# Patient Record
Sex: FEMALE | Race: WHITE | NOT HISPANIC OR LATINO | Employment: OTHER | URBAN - METROPOLITAN AREA
[De-identification: names, ages, dates, MRNs, and addresses within clinical notes are randomized per-mention and may not be internally consistent; named-entity substitution may affect disease eponyms.]

---

## 2017-04-24 ENCOUNTER — TRANSCRIBE ORDERS (OUTPATIENT)
Dept: ADMINISTRATIVE | Facility: HOSPITAL | Age: 72
End: 2017-04-24

## 2017-04-24 DIAGNOSIS — Z12.31 VISIT FOR SCREENING MAMMOGRAM: Primary | ICD-10-CM

## 2017-05-01 ENCOUNTER — HOSPITAL ENCOUNTER (OUTPATIENT)
Dept: MAMMOGRAPHY | Facility: HOSPITAL | Age: 72
Discharge: HOME | End: 2017-05-01
Attending: FAMILY MEDICINE | Admitting: FAMILY MEDICINE

## 2017-05-01 DIAGNOSIS — Z12.31 VISIT FOR SCREENING MAMMOGRAM: ICD-10-CM

## 2017-05-01 PROCEDURE — G0202 SCR MAMMO BI INCL CAD: HCPCS | Performed by: RADIOLOGY

## 2017-05-01 PROCEDURE — G0202 SCR MAMMO BI INCL CAD: HCPCS

## 2017-05-01 PROCEDURE — 77063 BREAST TOMOSYNTHESIS BI: CPT

## 2017-05-01 PROCEDURE — 77063 BREAST TOMOSYNTHESIS BI: CPT | Performed by: RADIOLOGY

## 2017-05-09 ENCOUNTER — TRANSCRIBE ORDERS (OUTPATIENT)
Dept: MAMMOGRAPHY | Facility: HOSPITAL | Age: 72
End: 2017-05-09

## 2017-05-09 ENCOUNTER — HOSPITAL ENCOUNTER (OUTPATIENT)
Dept: MAMMOGRAPHY | Facility: HOSPITAL | Age: 72
Discharge: HOME | End: 2017-05-09
Admitting: FAMILY MEDICINE

## 2017-05-09 DIAGNOSIS — R92.8 ABNORMAL MAMMOGRAM: ICD-10-CM

## 2017-05-09 DIAGNOSIS — R92.8 ABNORMAL MAMMOGRAM: Primary | ICD-10-CM

## 2017-05-09 PROCEDURE — G0279 TOMOSYNTHESIS, MAMMO: HCPCS

## 2017-05-09 PROCEDURE — G0204 DX MAMMO INCL CAD BI: HCPCS

## 2017-05-09 PROCEDURE — G0204 DX MAMMO INCL CAD BI: HCPCS | Performed by: RADIOLOGY

## 2017-05-09 PROCEDURE — G0279 TOMOSYNTHESIS, MAMMO: HCPCS | Performed by: RADIOLOGY

## 2017-05-26 ENCOUNTER — HOSPITAL ENCOUNTER (OUTPATIENT)
Dept: MAMMOGRAPHY | Facility: HOSPITAL | Age: 72
Discharge: HOME | End: 2017-05-26

## 2017-05-26 ENCOUNTER — HOSPITAL ENCOUNTER (OUTPATIENT)
Dept: MAMMOGRAPHY | Facility: HOSPITAL | Age: 72
Discharge: HOME | End: 2017-05-26
Attending: RADIOLOGY | Admitting: RADIOLOGY

## 2017-05-26 DIAGNOSIS — R92.8 ABNORMAL MAMMOGRAM: ICD-10-CM

## 2017-05-26 PROCEDURE — 19081 BX BREAST 1ST LESION STRTCTC: CPT | Performed by: RADIOLOGY

## 2017-05-26 PROCEDURE — G0206 DX MAMMO INCL CAD UNI: HCPCS | Performed by: RADIOLOGY

## 2017-05-26 PROCEDURE — 88305 TISSUE EXAM BY PATHOLOGIST: CPT | Performed by: RADIOLOGY

## 2017-05-26 PROCEDURE — 76098 X-RAY EXAM SURGICAL SPECIMEN: CPT

## 2017-05-26 RX ORDER — LIDOCAINE HYDROCHLORIDE 10 MG/ML
5 INJECTION, SOLUTION INFILTRATION; PERINEURAL ONCE
Status: COMPLETED | OUTPATIENT
Start: 2017-05-26 | End: 2017-05-26

## 2017-05-26 RX ORDER — LIDOCAINE HYDROCHLORIDE AND EPINEPHRINE 10; 10 MG/ML; UG/ML
10 INJECTION, SOLUTION INFILTRATION; PERINEURAL ONCE
Status: COMPLETED | OUTPATIENT
Start: 2017-05-26 | End: 2017-05-26

## 2017-05-26 RX ADMIN — LIDOCAINE HYDROCHLORIDE,EPINEPHRINE BITARTRATE 10 ML: 10; .01 INJECTION, SOLUTION INFILTRATION; PERINEURAL at 12:10

## 2017-05-26 RX ADMIN — LIDOCAINE HYDROCHLORIDE 5 ML: 10 INJECTION, SOLUTION INFILTRATION; PERINEURAL at 12:10

## 2017-05-30 LAB
CYTO UR: NORMAL
LAB AP CASE REPORT: NORMAL
LAB AP CLINICAL INFORMATION: NORMAL
LAB AP DIAGNOSIS COMMENT: NORMAL
Lab: NORMAL
PATH REPORT.FINAL DX SPEC: NORMAL
PATH REPORT.GROSS SPEC: NORMAL

## 2017-05-31 ENCOUNTER — TELEPHONE (OUTPATIENT)
Dept: MAMMOGRAPHY | Facility: HOSPITAL | Age: 72
End: 2017-05-31

## 2017-05-31 ENCOUNTER — TRANSCRIBE ORDERS (OUTPATIENT)
Dept: ADMINISTRATIVE | Facility: HOSPITAL | Age: 72
End: 2017-05-31

## 2017-05-31 DIAGNOSIS — Z12.31 VISIT FOR SCREENING MAMMOGRAM: ICD-10-CM

## 2017-05-31 DIAGNOSIS — R92.8 ABNORMAL MAMMOGRAM: Primary | ICD-10-CM

## 2017-08-15 ENCOUNTER — HOSPITAL ENCOUNTER (OUTPATIENT)
Dept: GENERAL RADIOLOGY | Facility: HOSPITAL | Age: 72
Discharge: HOME | End: 2017-08-15
Attending: FAMILY MEDICINE | Admitting: FAMILY MEDICINE

## 2017-08-15 ENCOUNTER — TRANSCRIBE ORDERS (OUTPATIENT)
Dept: ADMINISTRATIVE | Facility: HOSPITAL | Age: 72
End: 2017-08-15

## 2017-08-15 DIAGNOSIS — M17.11 OSTEOARTHRITIS OF RIGHT KNEE, UNSPECIFIED OSTEOARTHRITIS TYPE: ICD-10-CM

## 2017-08-15 DIAGNOSIS — M16.11 OSTEOARTHRITIS OF RIGHT HIP, UNSPECIFIED OSTEOARTHRITIS TYPE: ICD-10-CM

## 2017-08-15 DIAGNOSIS — M16.11 OSTEOARTHRITIS OF RIGHT HIP, UNSPECIFIED OSTEOARTHRITIS TYPE: Primary | ICD-10-CM

## 2017-08-15 PROCEDURE — 73502 X-RAY EXAM HIP UNI 2-3 VIEWS: CPT

## 2017-08-15 PROCEDURE — 73562 X-RAY EXAM OF KNEE 3: CPT

## 2017-11-28 ENCOUNTER — APPOINTMENT (OUTPATIENT)
Dept: MAMMOGRAPHY | Facility: HOSPITAL | Age: 72
End: 2017-11-28
Attending: FAMILY MEDICINE

## 2018-02-20 NOTE — TELEPHONE ENCOUNTER
Per patient  Request  E-rx  Advair 500mg /50 1 puff bid disp # 1  No refills she is  Making a appointment

## 2018-03-02 ENCOUNTER — OFFICE VISIT (OUTPATIENT)
Dept: PULMONOLOGY | Facility: CLINIC | Age: 73
End: 2018-03-02

## 2018-03-02 VITALS
RESPIRATION RATE: 18 BRPM | HEIGHT: 61 IN | WEIGHT: 120 LBS | HEART RATE: 72 BPM | DIASTOLIC BLOOD PRESSURE: 82 MMHG | TEMPERATURE: 97.2 F | SYSTOLIC BLOOD PRESSURE: 140 MMHG | OXYGEN SATURATION: 95 % | BODY MASS INDEX: 22.66 KG/M2

## 2018-03-02 DIAGNOSIS — D86.9 SARCOIDOSIS: Primary | ICD-10-CM

## 2018-03-02 PROCEDURE — 94726 PLETHYSMOGRAPHY LUNG VOLUMES: CPT | Performed by: NURSE PRACTITIONER

## 2018-03-02 PROCEDURE — 94729 DIFFUSING CAPACITY: CPT | Performed by: NURSE PRACTITIONER

## 2018-03-02 PROCEDURE — 94375 RESPIRATORY FLOW VOLUME LOOP: CPT | Performed by: NURSE PRACTITIONER

## 2018-03-02 PROCEDURE — 99214 OFFICE O/P EST MOD 30 MIN: CPT | Performed by: NURSE PRACTITIONER

## 2018-03-02 NOTE — PROGRESS NOTES
[facility]    CHIEF COMPLAINT    Follow-up asthma, limited smoking history, GERD    HISTORY OF PRESENT ILLNESS    Lucy Thompson is a 72 y.o.female here today for yearly follow-up, she is a history of moderate to severe obstruction due to a combination of asthma and limited smoking.  She has a history of sarcoidosis, and was last in the office 11/2016.    She has a very limited smoking history, minimal smoking just socially 1 or 2 cigarettes a week before she quit almost 18 years ago.     She's done well this past year with no hospitalizations or emergency room visits.  She does not feel limited by dyspnea.  She's had no recent respiratory infections.  She still has the Advair that she tries to take this once daily, she notices if she misses the second dose and has increasing dyspnea .     She denies abdominal pain or dysphagia.  She has no chest pain or palpitations.     She really doesn't have a cough or sputum production, she has not had any bronchitis or sinusitis over the past year and feels she is done well. She has no vision changes or skin rashes; and has a remote h/o sarcoidosis.     She denies wheezing and really doesn't have to use her pro-air that much.  She is current on her immunizations.            Patient Active Problem List   Diagnosis   • Allergic rhinitis   • Asthma   • Dyslipidemia   • Glaucoma   • Osteoarthritis   • Sarcoidosis       No Known Allergies    Current Outpatient Prescriptions:   •  albuterol (PROAIR HFA) 108 (90 BASE) MCG/ACT inhaler, ProAir HFA AERS; Patient Sig: ProAir HFA AERS INHALE 1-2 PUFFS EVERY 4-6 HOURS AS NEEDED AND AS DIRECTED.; 0; 03-May-2013; Active, Disp: , Rfl:   •  atorvastatin (LIPITOR) 10 MG tablet, Take  by mouth Daily., Disp: , Rfl:   •  betaxolol (BETOPTIC-S) 0.25 % ophthalmic suspension, Apply  to eye Every 12 (Twelve) Hours., Disp: , Rfl:   •  bimatoprost (LUMIGAN) 0.01 % ophthalmic drops, Apply  to eye Daily., Disp: , Rfl:   •  dorzolamide  "(TRUSOPT) 2 % ophthalmic solution, Apply  to eye 2 (Two) Times a Day., Disp: , Rfl:   •  fluticasone-salmeterol (ADVAIR DISKUS) 500-50 MCG/DOSE DISKUS, Inhale 1 puff 2 (Two) Times a Day., Disp: 60 each, Rfl: 0  •  omeprazole (priLOSEC) 20 MG capsule, Take 20 mg by mouth As Needed., Disp: , Rfl:   MEDICATION LIST AND ALLERGIES REVIEWED.    Social History   Substance Use Topics   • Smoking status: Former Smoker     Types: Cigarettes   • Smokeless tobacco: None      Comment: 1-2 cigaretts socially per week- quit 16 years ago   • Alcohol use No       FAMILY AND SOCIAL HISTORY REVIEWED.    Review of Systems   Constitutional: Negative for activity change, chills, fatigue and fever.   HENT: Positive for postnasal drip. Negative for hearing loss, nosebleeds and sneezing.    Respiratory: Negative for apnea, cough, chest tightness, shortness of breath, wheezing and stridor.    Cardiovascular: Negative for chest pain, palpitations and leg swelling.        NO INCREASE IN MILD BLE EDEMA; NO CALF TENDERNESS    Gastrointestinal: Negative for abdominal pain, diarrhea and nausea.   Neurological: Negative for dizziness, syncope and light-headedness.   .    /82 (BP Location: Left arm, Patient Position: Sitting, Cuff Size: Adult)  Pulse 72  Temp 97.2 °F (36.2 °C)  Resp 18  Ht 154.9 cm (60.98\")  Wt 54.4 kg (120 lb)  SpO2 95%  BMI 22.69 kg/m2  Physical Exam   Constitutional: She is oriented to person, place, and time. She appears well-developed. No distress.   HENT:   Head: Normocephalic.   Eyes: Pupils are equal, round, and reactive to light.   Neck: No JVD present. No thyromegaly present.   Cardiovascular: Normal rate, regular rhythm and normal heart sounds.  Exam reveals no friction rub.    Mild BLE edema; no venous cords or calf tenderness.    Pulmonary/Chest: Effort normal. No stridor. No respiratory distress. She has no wheezes. She has no rales. She exhibits no tenderness.   Lymphadenopathy:     She has no cervical " adenopathy.   Neurological: She is alert and oriented to person, place, and time.   Skin: She is not diaphoretic.   Psychiatric: She has a normal mood and affect. Her behavior is normal. Thought content normal.       RESULTS    Chest x-ray PA and lateral obtained in the office today: No acute infiltrates or effusions, no change from her film in 2015, she has mild chronic interstitial scarring in the mid lung zones bilaterally, no effusions or acute infiltrates.    PFTs: see scanned report      PROBLEM LIST    Allergic rhinitis    Asthma    Remote minimal smoking history    Sarcoidosis      DISCUSSION    She is trying not to use Advair but I told her to continue to use it regularly and that the prescription dose is always one puff morning one puff night, however I gave her written prescription and samples of Brio.  Possible she will like this better and it one puff a day she be getting the prescription strength.    She was given samples of Brio 200 with written instructions not to use it the same time his Advair because of the same medications, use 1 puff daily rinse and spit after use.    We reviewed x-ray and pulmonary function testing, she's done well since her last visit in like to follow-up in one year,and was told to call and given extensions 104 if needed for an earlier visit if problems arise prior to the scheduled followup.        Claritza Mares, RHETT  03/02/20181:23 PM  Electronically signed     Please note that portions of this note were completed with a voice recognition program. Efforts were made to edit the dictations, but occasionally words are mistranscribed.      CC: Andreas Segura MD

## 2018-04-04 ENCOUNTER — TELEPHONE (OUTPATIENT)
Dept: PULMONOLOGY | Facility: CLINIC | Age: 73
End: 2018-04-04

## 2018-04-04 NOTE — TELEPHONE ENCOUNTER
Claritza gave her  Breo at last visit to replace the Advair   She  Feels that she did better on the Advair would like to go back on it if that's ok?  will need an Rx sent

## 2018-04-09 ENCOUNTER — TELEPHONE (OUTPATIENT)
Dept: PULMONOLOGY | Facility: CLINIC | Age: 73
End: 2018-04-09

## 2018-04-09 RX ORDER — AZITHROMYCIN 250 MG/1
TABLET, FILM COATED ORAL
Qty: 6 TABLET | Refills: 0 | Status: SHIPPED | OUTPATIENT
Start: 2018-04-09 | End: 2018-10-30 | Stop reason: SDUPTHER

## 2018-04-09 RX ORDER — PREDNISONE 10 MG/1
TABLET ORAL
Qty: 21 TABLET | Refills: 0 | Status: SHIPPED | OUTPATIENT
Start: 2018-04-09 | End: 2019-03-19

## 2018-04-09 RX ORDER — ALBUTEROL SULFATE 90 UG/1
2 AEROSOL, METERED RESPIRATORY (INHALATION) EVERY 4 HOURS PRN
Qty: 1 INHALER | Refills: 6 | Status: SHIPPED | OUTPATIENT
Start: 2018-04-09 | End: 2019-03-19 | Stop reason: SDUPTHER

## 2018-04-09 NOTE — TELEPHONE ENCOUNTER
Ms Thompson complains of cough, congestion, and chest tightness. She denies fever or chest pain.   She denies any recent antibiotic use. She also requests a refill on her rescue inhaler.    Azithromycin, prednisone, and ProAir prescribed.

## 2018-10-30 ENCOUNTER — TELEPHONE (OUTPATIENT)
Dept: PULMONOLOGY | Facility: CLINIC | Age: 73
End: 2018-10-30

## 2018-10-30 RX ORDER — AZITHROMYCIN 250 MG/1
TABLET, FILM COATED ORAL
Qty: 6 TABLET | Refills: 0 | Status: SHIPPED | OUTPATIENT
Start: 2018-10-30 | End: 2019-03-19

## 2018-10-30 RX ORDER — PREDNISONE 10 MG/1
TABLET ORAL
Qty: 31 TABLET | Refills: 0 | Status: SHIPPED | OUTPATIENT
Start: 2018-10-30 | End: 2019-03-19

## 2018-10-30 NOTE — TELEPHONE ENCOUNTER
Pt notified that Rx ZPack, Prednisone, and Advair has been sent to pharmacy. Pt verbalized understanding.

## 2018-10-30 NOTE — TELEPHONE ENCOUNTER
Pt called stating that she is having a dry cough for 3 days and SOB. Denies fever or chest pain. Please advise. Pt is also requesting refill on Rx Advair to [facility].

## 2019-03-15 ENCOUNTER — TELEPHONE (OUTPATIENT)
Dept: PULMONOLOGY | Facility: CLINIC | Age: 74
End: 2019-03-15

## 2019-03-19 ENCOUNTER — OFFICE VISIT (OUTPATIENT)
Dept: PULMONOLOGY | Facility: CLINIC | Age: 74
End: 2019-03-19

## 2019-03-19 VITALS
HEART RATE: 68 BPM | RESPIRATION RATE: 18 BRPM | DIASTOLIC BLOOD PRESSURE: 84 MMHG | BODY MASS INDEX: 24.01 KG/M2 | SYSTOLIC BLOOD PRESSURE: 126 MMHG | OXYGEN SATURATION: 96 % | TEMPERATURE: 97.7 F | WEIGHT: 127 LBS

## 2019-03-19 DIAGNOSIS — D86.9 SARCOIDOSIS: ICD-10-CM

## 2019-03-19 DIAGNOSIS — J45.909 ASTHMA, UNSPECIFIED ASTHMA SEVERITY, UNSPECIFIED WHETHER COMPLICATED, UNSPECIFIED WHETHER PERSISTENT: Primary | ICD-10-CM

## 2019-03-19 DIAGNOSIS — J30.1 SEASONAL ALLERGIC RHINITIS DUE TO POLLEN: ICD-10-CM

## 2019-03-19 PROCEDURE — 94375 RESPIRATORY FLOW VOLUME LOOP: CPT | Performed by: NURSE PRACTITIONER

## 2019-03-19 PROCEDURE — 99213 OFFICE O/P EST LOW 20 MIN: CPT | Performed by: NURSE PRACTITIONER

## 2019-03-19 RX ORDER — ALBUTEROL SULFATE 90 UG/1
2 AEROSOL, METERED RESPIRATORY (INHALATION) EVERY 4 HOURS PRN
Qty: 1 INHALER | Refills: 6 | Status: SHIPPED | OUTPATIENT
Start: 2019-03-19 | End: 2020-06-02 | Stop reason: SDUPTHER

## 2019-03-19 NOTE — PROGRESS NOTES
[facility]monary Follow up    CHIEF COMPLAINT    Restrictive and obstructive airway disease    Subjective   HISTORY OF PRESENT ILLNESS    Lucy Thompson is a 73 y.o.female here today for annual follow-up.  She is less in the office in March 2018 by Ms. Mares.  She has been followed for history of moderate to severe obstructive airway disease and asthma.  With minimal smoking history, she only smoked socially 1-2 cigarettes a week and quit 18 years ago.  She does have a restrictive airway disease as well with occasional cough and wheezing.    She uses Advair twice daily, she has tried Breo in the past which caused her actually quite a bit more shortness of air and wheezing.  She is back on Advair 1-2 times a day as needed for her wheezing and shortness of breath.  She was a quite a bit more congested this fall and winter.  She is starting to feel better now.  Usually in the fall is her worst time when she starts taking Singulair as well as Zyrtec.    She denies any cough or sputum production today.  No postnasal drainage or hoarseness.    She does have a remote history of sarcoidosis diagnosed by mediastinoscopy almost 20 years ago.  She never required treatment.      Patient Active Problem List   Diagnosis   • Allergic rhinitis   • Asthma   • Dyslipidemia   • Glaucoma   • Osteoarthritis   • Sarcoidosis       No Known Allergies    Current Outpatient Medications:   •  albuterol sulfate HFA (PROAIR HFA) 108 (90 Base) MCG/ACT inhaler, Inhale 2 puffs Every 4 (Four) Hours As Needed for Wheezing or Shortness of Air., Disp: 1 inhaler, Rfl: 6  •  betaxolol (BETOPTIC-S) 0.25 % ophthalmic suspension, Apply  to eye Every 12 (Twelve) Hours., Disp: , Rfl:   •  bimatoprost (LUMIGAN) 0.01 % ophthalmic drops, Apply  to eye Daily., Disp: , Rfl:   •  dorzolamide (TRUSOPT) 2 % ophthalmic solution, Apply  to eye 2 (Two) Times a Day., Disp: , Rfl:   •  fluticasone-salmeterol (ADVAIR DISKUS) 500-50 MCG/DOSE DISKUS, Inhale 1 puff 2 (Two)  Times a Day., Disp: 60 each, Rfl: 11  •  omeprazole (priLOSEC) 20 MG capsule, Take 20 mg by mouth As Needed., Disp: , Rfl:   MEDICATION LIST AND ALLERGIES REVIEWED.    Social History     Tobacco Use   • Smoking status: Former Smoker     Types: Cigarettes   • Tobacco comment: 1-2 cigaretts socially per week- quit 16 years ago   Substance Use Topics   • Alcohol use: No   • Drug use: No       FAMILY AND SOCIAL HISTORY REVIEWED.    Review of Systems   Constitutional: Negative for chills, fatigue, fever and unexpected weight change.   HENT: Negative for congestion, nosebleeds, postnasal drip, rhinorrhea, sinus pressure and trouble swallowing.    Respiratory: Positive for cough and wheezing. Negative for chest tightness and shortness of breath.    Cardiovascular: Negative for chest pain and leg swelling.   Gastrointestinal: Negative for abdominal pain, constipation, diarrhea, nausea and vomiting.   Genitourinary: Negative for dysuria, frequency, hematuria and urgency.   Musculoskeletal: Negative for myalgias.   Neurological: Negative for dizziness, weakness, numbness and headaches.   All other systems reviewed and are negative.  .  Objective   /84 (BP Location: Right arm, Patient Position: Sitting, Cuff Size: Adult)   Pulse 68   Temp 97.7 °F (36.5 °C)   Resp 18   Wt 57.6 kg (127 lb)   SpO2 96% Comment: room air at rest  BMI 24.01 kg/m²       There is no immunization history on file for this patient.    Physical Exam   Constitutional: She is oriented to person, place, and time. She appears well-developed and well-nourished.   HENT:   Head: Normocephalic and atraumatic.   Eyes: EOM are normal. Pupils are equal, round, and reactive to light.   Neck: Normal range of motion. Neck supple.   Cardiovascular: Normal rate and regular rhythm.   No murmur heard.  Pulmonary/Chest: Effort normal and breath sounds normal. No respiratory distress. She has no wheezes. She has no rales.   Abdominal: Soft. Bowel sounds are  normal. She exhibits no distension.   Musculoskeletal: Normal range of motion. She exhibits no edema.   Neurological: She is alert and oriented to person, place, and time.   Skin: Skin is warm and dry. No erythema.   Psychiatric: She has a normal mood and affect. Her behavior is normal.   Vitals reviewed.        RESULTS    PFTS in the office today, read by me:  Moderate obstructive airway disease with an FEV1 of 1.00, 51% no change from last year    Assessment/Plan     PROBLEM LIST    Problem List Items Addressed This Visit        Respiratory    Allergic rhinitis    Asthma - Primary    Overview     Chest x-ray chronic nodular changes in the right lower lobe and old granulomatous changes bilaterally with some interstitial scarring in both lower lobes overall unchanged compared to 2010  07/21/15      Complete pulmonary function test vital capacity 2.36 L, 87%, FEV1 1.02 L, 50%, ratio 43% consistent with severe obstruction, no response to bronchodilators, total lung capacity 4.7 713%, residual I am 2.41, 144%, diffusion capacity 8.3, 46% severe obstruction and air trapping moderate to severe diffusion impairment unchanged compared to 2014 07/21/15           Relevant Medications    fluticasone-salmeterol (ADVAIR DISKUS) 500-50 MCG/DOSE DISKUS    albuterol sulfate HFA (PROAIR HFA) 108 (90 Base) MCG/ACT inhaler    Other Relevant Orders    Spirometry Without Bronchodilator (Completed)       Other    Sarcoidosis            DISCUSSION    Currently she is doing well.  She does have obstructive and restrictive airway disease secondary to history of asthma, some sarcoidosis history, as well as minimal smoking.  Continue on her Advair twice daily and Pro Air as needed.  I refilled all of her prescriptions for 1 year today in the office    Encouraged her to take her Zyrtec as needed seasonally to help with seasonal allergy symptoms.    Follow-up in 1 year or sooner if needed    I spent 15 minutes with the patient. I spent > 50%  percent of this time counseling and discussing diagnostic testing, evaluation, current status and management.    Maliha Gonzalez, APRN  03/19/20198:36 AM  Electronically signed     Please note that portions of this note were completed with a voice recognition program. Efforts were made to edit the dictations, but occasionally words are mistranscribed.      CC: Andreas Segura MD

## 2019-11-13 ENCOUNTER — TELEPHONE (OUTPATIENT)
Dept: PULMONOLOGY | Facility: CLINIC | Age: 74
End: 2019-11-13

## 2019-11-13 DIAGNOSIS — J45.909 UNCOMPLICATED ASTHMA, UNSPECIFIED ASTHMA SEVERITY, UNSPECIFIED WHETHER PERSISTENT: Primary | ICD-10-CM

## 2020-03-16 DIAGNOSIS — J45.909 UNCOMPLICATED ASTHMA, UNSPECIFIED ASTHMA SEVERITY, UNSPECIFIED WHETHER PERSISTENT: ICD-10-CM

## 2020-03-16 NOTE — TELEPHONE ENCOUNTER
Refill request for Rx Advair approved and faxed per chart to [facility] per pt's request. Pt notified and verbalized understanding.

## 2020-05-11 DIAGNOSIS — J45.909 UNCOMPLICATED ASTHMA, UNSPECIFIED ASTHMA SEVERITY, UNSPECIFIED WHETHER PERSISTENT: ICD-10-CM

## 2020-06-02 ENCOUNTER — OFFICE VISIT (OUTPATIENT)
Dept: PULMONOLOGY | Facility: CLINIC | Age: 75
End: 2020-06-02

## 2020-06-02 VITALS
SYSTOLIC BLOOD PRESSURE: 122 MMHG | OXYGEN SATURATION: 96 % | HEIGHT: 61 IN | DIASTOLIC BLOOD PRESSURE: 80 MMHG | HEART RATE: 68 BPM | WEIGHT: 119 LBS | TEMPERATURE: 97.8 F | BODY MASS INDEX: 22.47 KG/M2

## 2020-06-02 DIAGNOSIS — J30.1 SEASONAL ALLERGIC RHINITIS DUE TO POLLEN: ICD-10-CM

## 2020-06-02 DIAGNOSIS — D86.9 SARCOIDOSIS: Primary | ICD-10-CM

## 2020-06-02 DIAGNOSIS — J45.909 UNCOMPLICATED ASTHMA, UNSPECIFIED ASTHMA SEVERITY, UNSPECIFIED WHETHER PERSISTENT: ICD-10-CM

## 2020-06-02 PROCEDURE — 99214 OFFICE O/P EST MOD 30 MIN: CPT | Performed by: NURSE PRACTITIONER

## 2020-06-02 RX ORDER — ALBUTEROL SULFATE 90 UG/1
2 AEROSOL, METERED RESPIRATORY (INHALATION) EVERY 4 HOURS PRN
Qty: 1 INHALER | Refills: 6 | Status: SHIPPED | OUTPATIENT
Start: 2020-06-02

## 2020-06-02 NOTE — PROGRESS NOTES
[facility] Pulmonary Follow up    CHIEF COMPLAINT    Asthma    Subjective   HISTORY OF PRESENT ILLNESS    Lucy Thompson is a 75 y.o.female here today for routine annual follow up.     She has been followed for history of moderate to severe obstructive airway disease and asthma.  With minimal smoking history, she only smoked socially 1-2 cigarettes a week and quit 18 years ago.  She does have a restrictive airway disease as well with occasional cough and wheezing.    She does have a remote history of sarcoidosis diagnosed by mediastinoscopy almost 20 years ago.  She never required treatment.    Overall she is done extremely well.  She has had no recent acute illnesses or exacerbations.  She had one episode of bronchitis earlier in the winter otherwise she is done well.  She continues to get out daily and be quite active.    She has stopped taking the Wixela in the past, however she did notice some worsening in her symptoms after about 3 or 4 days.    Patient Active Problem List   Diagnosis   • Allergic rhinitis   • Asthma   • Dyslipidemia   • Glaucoma   • Osteoarthritis   • Sarcoidosis       No Known Allergies    Current Outpatient Medications:   •  albuterol sulfate HFA (ProAir HFA) 108 (90 Base) MCG/ACT inhaler, Inhale 2 puffs Every 4 (Four) Hours As Needed for Wheezing or Shortness of Air., Disp: 1 inhaler, Rfl: 6  •  betaxolol (BETOPTIC-S) 0.25 % ophthalmic suspension, Apply  to eye Every 12 (Twelve) Hours., Disp: , Rfl:   •  bimatoprost (LUMIGAN) 0.01 % ophthalmic drops, Apply  to eye Daily., Disp: , Rfl:   •  dorzolamide (TRUSOPT) 2 % ophthalmic solution, Apply  to eye 2 (Two) Times a Day., Disp: , Rfl:   •  fluticasone-salmeterol (Wixela Inhub) 500-50 MCG/DOSE DISKUS, Inhale 1 puff 2 (Two) Times a Day., Disp: 1 each, Rfl: 11  •  omeprazole (priLOSEC) 20 MG capsule, Take 20 mg by mouth As Needed., Disp: , Rfl:   MEDICATION LIST AND ALLERGIES REVIEWED.    Social History     Tobacco Use   • Smoking status: Former  "Smoker     Types: Cigarettes   • Smokeless tobacco: Never Used   • Tobacco comment: 1-2 cigaretts socially per week- quit 16 years ago   Substance Use Topics   • Alcohol use: No   • Drug use: No       FAMILY AND SOCIAL HISTORY REVIEWED.    Review of Systems   Constitutional: Negative for chills, fatigue, fever and unexpected weight change.   HENT: Negative for congestion, nosebleeds, postnasal drip, rhinorrhea, sinus pressure and trouble swallowing.    Respiratory: Negative for cough, chest tightness, shortness of breath and wheezing.    Cardiovascular: Negative for chest pain and leg swelling.   Gastrointestinal: Negative for abdominal pain, constipation, diarrhea, nausea and vomiting.   Genitourinary: Negative for dysuria, frequency, hematuria and urgency.   Musculoskeletal: Negative for myalgias.   Neurological: Negative for dizziness, weakness, numbness and headaches.   All other systems reviewed and are negative.  .  Objective   /80   Pulse 68   Temp 97.8 °F (36.6 °C)   Ht 154.9 cm (61\")   Wt 54 kg (119 lb)   SpO2 96% Comment: resting, room air  BMI 22.48 kg/m²     Immunization History   Administered Date(s) Administered   • Flu Vaccine Quad PF >18YRS 09/13/2011   • Fluzone High Dose =>65 Years ([employer] ONLY) 10/01/2013, 11/13/2015, 10/09/2019   • Pneumococcal Conjugate 13-Valent (PCV13) 05/04/2016   • Pneumococcal Polysaccharide (PPSV23) 10/01/2013   • Tdap 11/13/2015       Physical Exam   Constitutional: She is oriented to person, place, and time. She appears well-developed and well-nourished.   HENT:   Head: Normocephalic and atraumatic.   Eyes: Pupils are equal, round, and reactive to light. EOM are normal.   Neck: Normal range of motion. Neck supple.   Cardiovascular: Normal rate and regular rhythm.   No murmur heard.  Pulmonary/Chest: Effort normal and breath sounds normal. No respiratory distress. She has no wheezes. She has no rales.   Abdominal: Soft. Bowel sounds are normal. She exhibits " no distension.   Musculoskeletal: Normal range of motion. She exhibits no edema.   Neurological: She is alert and oriented to person, place, and time.   Skin: Skin is warm and dry. No erythema.   Psychiatric: She has a normal mood and affect. Her behavior is normal.   Vitals reviewed.        RESULTS    PFTS in the office today, read by me:  Unable to perform secondary to epidemic    PA and lateral chest x-ray done the office today reviewed by me  Chronic interstitial changes, no acute changes.  Awaiting final MD interpretation      Assessment/Plan     PROBLEM LIST    Problem List Items Addressed This Visit        Respiratory    Allergic rhinitis    Asthma    Overview     Chest x-ray chronic nodular changes in the right lower lobe and old granulomatous changes bilaterally with some interstitial scarring in both lower lobes overall unchanged compared to 2010  07/21/15      Complete pulmonary function test vital capacity 2.36 L, 87%, FEV1 1.02 L, 50%, ratio 43% consistent with severe obstruction, no response to bronchodilators, total lung capacity 4.7 713%, residual I am 2.41, 144%, diffusion capacity 8.3, 46% severe obstruction and air trapping moderate to severe diffusion impairment unchanged compared to 2014 07/21/15           Relevant Medications    albuterol sulfate HFA (ProAir HFA) 108 (90 Base) MCG/ACT inhaler    fluticasone-salmeterol (Wixela Inhub) 500-50 MCG/DOSE DISKUS       Other    Sarcoidosis - Primary    Relevant Orders    XR Chest PA & Lateral            DISCUSSION    At this time she is doing very well.  I will reorder her Wixela and pro-air to use as needed.    She is had no change in her symptoms over the last year.  Her chest x-ray is unchanged.  She will follow-up with full PFTs on her next visit.    Follow-up annually or sooner if needed.    I spent 25 minutes face to face with the patient. I spent > 50% percent of this time counseling and discussing diagnostic testing, evaluation, current status,  treatment options and management.    Maliha Gonzalez, RHETT  06/02/20201:58 PM  Electronically signed     Please note that portions of this note were completed with a voice recognition program. Efforts were made to edit the dictations, but occasionally words are mistranscribed.      CC: Andreas Segura MD

## 2021-01-07 ENCOUNTER — OFFICE VISIT (OUTPATIENT)
Dept: PULMONOLOGY | Facility: CLINIC | Age: 76
End: 2021-01-07

## 2021-01-07 VITALS
HEIGHT: 61 IN | TEMPERATURE: 97.7 F | WEIGHT: 130.13 LBS | SYSTOLIC BLOOD PRESSURE: 108 MMHG | OXYGEN SATURATION: 96 % | DIASTOLIC BLOOD PRESSURE: 80 MMHG | BODY MASS INDEX: 24.57 KG/M2 | RESPIRATION RATE: 16 BRPM | HEART RATE: 74 BPM

## 2021-01-07 DIAGNOSIS — R04.2 HEMOPTYSIS: Primary | ICD-10-CM

## 2021-01-07 DIAGNOSIS — J45.20 MILD INTERMITTENT ASTHMA WITHOUT COMPLICATION: ICD-10-CM

## 2021-01-07 PROCEDURE — 99214 OFFICE O/P EST MOD 30 MIN: CPT | Performed by: NURSE PRACTITIONER

## 2021-01-07 RX ORDER — ATORVASTATIN CALCIUM 10 MG/1
10 TABLET, FILM COATED ORAL DAILY
COMMUNITY
Start: 2021-01-04

## 2021-01-07 RX ORDER — TRAZODONE HYDROCHLORIDE 50 MG/1
50 TABLET ORAL NIGHTLY PRN
COMMUNITY
Start: 2020-12-11

## 2021-01-07 RX ORDER — ALBUTEROL SULFATE 1.25 MG/3ML
1 SOLUTION RESPIRATORY (INHALATION) EVERY 6 HOURS PRN
Qty: 120 VIAL | Refills: 6 | Status: SHIPPED | OUTPATIENT
Start: 2021-01-07

## 2021-01-07 NOTE — PROGRESS NOTES
"[facility] Pulmonary Follow up      Chief Complaint  Coughing Up Blood (f/u)    Subjective          Lucy Thompson [facility] for episodes of hemoptysis.  She has had around 4 episodes of hemoptysis in the last couple months.  She states the sputum comes out is like a little \"clot\" with some bright red and dark red mixed in.    She denies any fevers or chills.  No malaise or changes in appetite.  She continues to use her Wixela twice a day and rarely needs her albuterol.  She does not feel like she has any worsening congestion, tightness, or shortness of breath.          Objective     Vital Signs:   /80 (BP Location: Left arm, Patient Position: Sitting, Cuff Size: Adult)   Pulse 74   Temp 97.7 °F (36.5 °C)   Resp 16   Ht 154.9 cm (61\")   Wt 59 kg (130 lb 2 oz)   SpO2 96% Comment: room air at rest  BMI 24.59 kg/m²       Physical Exam  Vitals signs reviewed.   Constitutional:       General: She is not in acute distress.     Appearance: Normal appearance. She is well-developed.   HENT:      Head: Normocephalic and atraumatic.   Eyes:      Pupils: Pupils are equal, round, and reactive to light.   Neck:      Musculoskeletal: Normal range of motion and neck supple.   Cardiovascular:      Rate and Rhythm: Normal rate and regular rhythm.      Heart sounds: No murmur.   Pulmonary:      Effort: Pulmonary effort is normal. No respiratory distress.      Breath sounds: No wheezing or rales.      Comments: Decreased bilaterally but no wheezing or rales  Abdominal:      General: Bowel sounds are normal. There is no distension.      Palpations: Abdomen is soft.   Musculoskeletal: Normal range of motion.   Skin:     General: Skin is warm and dry.      Findings: No erythema.   Neurological:      Mental Status: She is alert and oriented to person, place, and time.   Psychiatric:         Behavior: Behavior normal.          Result Review :              PA and lateral chest x-ray " done the office today reviewed by me  Awaiting final MD interpretation                 Assessment and Plan    Problem List Items Addressed This Visit        Other    Asthma    Overview     Chest x-ray chronic nodular changes in the right lower lobe and old granulomatous changes bilaterally with some interstitial scarring in both lower lobes overall unchanged compared to 2010  07/21/15      Complete pulmonary function test vital capacity 2.36 L, 87%, FEV1 1.02 L, 50%, ratio 43% consistent with severe obstruction, no response to bronchodilators, total lung capacity 4.7 713%, residual I am 2.41, 144%, diffusion capacity 8.3, 46% severe obstruction and air trapping moderate to severe diffusion impairment unchanged compared to 2014 07/21/15           Relevant Medications    albuterol (ACCUNEB) 1.25 MG/3ML nebulizer solution    Other Relevant Orders    Home Nebulizer      Other Visit Diagnoses     Hemoptysis    -  Primary    Relevant Orders    XR Chest PA & Lateral    CT Chest Without Contrast          With her worsening hemoptysis and abnormal chest x-ray would like to follow-up on a CT scan of the chest.    Also could not go ahead and set her up on home nebulizers with albuterol once or twice a day to help with her secretion clearance.  We did discuss that if this was just some impacted secretions that a would be helpful to use the nebulizer to mobilize her secretions on a daily basis.    Follow up after CT of the chest.      Follow up with Dr Segura in 3 months for routine visit.        Follow Up     No follow-ups on file.  Patient was given instructions and counseling regarding her condition or for health maintenance advice. Please see specific information pulled into the AVS if appropriate.     Maliha Gonzalez APRN, ACNP  [facility]  Electronically signed

## 2021-01-13 ENCOUNTER — HOSPITAL ENCOUNTER (OUTPATIENT)
Dept: CT IMAGING | Facility: HOSPITAL | Age: 76
Discharge: HOME | End: 2021-01-13
Admitting: NURSE PRACTITIONER

## 2021-01-13 PROCEDURE — 71250 CT THORAX DX C-: CPT

## 2021-01-20 DIAGNOSIS — R04.2 HEMOPTYSIS: ICD-10-CM

## 2021-01-20 DIAGNOSIS — J45.20 MILD INTERMITTENT ASTHMA WITHOUT COMPLICATION: Primary | ICD-10-CM

## 2021-01-20 RX ORDER — DOXYCYCLINE HYCLATE 100 MG/1
100 CAPSULE ORAL 2 TIMES DAILY
Qty: 20 CAPSULE | Refills: 0 | Status: SHIPPED | OUTPATIENT
Start: 2021-01-20 | End: 2021-07-28

## 2021-01-20 RX ORDER — PREDNISONE 10 MG/1
TABLET ORAL
Qty: 31 TABLET | Refills: 0 | Status: SHIPPED | OUTPATIENT
Start: 2021-01-20 | End: 2021-07-28

## 2021-01-20 NOTE — PROGRESS NOTES
I called to check on her today.  She is still having some shortness of breath and occasional hemoptysis.  We did review her CT of the chest.  I will go ahead and give her a course of antibiotics and steroids.  I would like to get a sputum for AFB and fungal culture to rule out an atypical infection.    She needs a follow up in 3 months.    If no improvement will proceed with bronchoscopy.

## 2021-01-25 DIAGNOSIS — R04.2 HEMOPTYSIS: Primary | ICD-10-CM

## 2021-01-26 ENCOUNTER — LAB (OUTPATIENT)
Dept: LAB | Facility: HOSPITAL | Age: 76
End: 2021-01-26

## 2021-01-26 DIAGNOSIS — M81.0 SENILE OSTEOPOROSIS: Primary | ICD-10-CM

## 2021-01-26 LAB
ALBUMIN SERPL-MCNC: 4.1 G/DL (ref 3.5–5.2)
ALBUMIN/GLOB SERPL: 1.7 G/DL
ALP SERPL-CCNC: 59 U/L (ref 39–117)
ALT SERPL W P-5'-P-CCNC: 9 U/L (ref 1–33)
ANION GAP SERPL CALCULATED.3IONS-SCNC: 8.3 MMOL/L (ref 5–15)
AST SERPL-CCNC: 12 U/L (ref 1–32)
BILIRUB SERPL-MCNC: 0.6 MG/DL (ref 0–1.2)
BUN SERPL-MCNC: 23 MG/DL (ref 8–23)
BUN/CREAT SERPL: 33.3 (ref 7–25)
CALCIUM SPEC-SCNC: 9.1 MG/DL (ref 8.6–10.5)
CHLORIDE SERPL-SCNC: 110 MMOL/L (ref 98–107)
CO2 SERPL-SCNC: 24.7 MMOL/L (ref 22–29)
CREAT SERPL-MCNC: 0.69 MG/DL (ref 0.57–1)
GFR SERPL CREATININE-BSD FRML MDRD: 83 ML/MIN/1.73
GLOBULIN UR ELPH-MCNC: 2.4 GM/DL
GLUCOSE SERPL-MCNC: 80 MG/DL (ref 65–99)
POTASSIUM SERPL-SCNC: 3.8 MMOL/L (ref 3.5–5.2)
PROT SERPL-MCNC: 6.5 G/DL (ref 6–8.5)
SODIUM SERPL-SCNC: 143 MMOL/L (ref 136–145)

## 2021-01-26 PROCEDURE — 36415 COLL VENOUS BLD VENIPUNCTURE: CPT

## 2021-01-26 PROCEDURE — 80053 COMPREHEN METABOLIC PANEL: CPT

## 2021-01-26 NOTE — PROGRESS NOTES
Pt called today stating that at this time she has not been able to cough up any sputum but will continue to try and is feeling a little better but still having sob. Pt advised once she feels she is able to cough up sputum she should bring to the office. Pt verbalized understanding.

## 2021-04-14 ENCOUNTER — HOSPITAL ENCOUNTER (OUTPATIENT)
Dept: CT IMAGING | Facility: HOSPITAL | Age: 76
Discharge: HOME | End: 2021-04-14
Admitting: NURSE PRACTITIONER

## 2021-04-14 DIAGNOSIS — R04.2 HEMOPTYSIS: ICD-10-CM

## 2021-04-14 PROCEDURE — 71250 CT THORAX DX C-: CPT

## 2021-05-06 ENCOUNTER — OFFICE VISIT (OUTPATIENT)
Dept: PULMONOLOGY | Facility: CLINIC | Age: 76
End: 2021-05-06

## 2021-05-06 VITALS
HEIGHT: 61 IN | TEMPERATURE: 98.4 F | SYSTOLIC BLOOD PRESSURE: 118 MMHG | BODY MASS INDEX: 23.98 KG/M2 | DIASTOLIC BLOOD PRESSURE: 70 MMHG | OXYGEN SATURATION: 96 % | HEART RATE: 74 BPM | WEIGHT: 127 LBS

## 2021-05-06 DIAGNOSIS — R04.2 HEMOPTYSIS: ICD-10-CM

## 2021-05-06 DIAGNOSIS — J45.909 UNCOMPLICATED ASTHMA, UNSPECIFIED ASTHMA SEVERITY, UNSPECIFIED WHETHER PERSISTENT: ICD-10-CM

## 2021-05-06 DIAGNOSIS — J45.20 MILD INTERMITTENT ASTHMA WITHOUT COMPLICATION: Primary | ICD-10-CM

## 2021-05-06 PROCEDURE — 99213 OFFICE O/P EST LOW 20 MIN: CPT | Performed by: INTERNAL MEDICINE

## 2021-05-06 RX ORDER — MOXIFLOXACIN 5 MG/ML
SOLUTION/ DROPS OPHTHALMIC
COMMUNITY
Start: 2021-02-10

## 2021-07-23 DIAGNOSIS — Z01.812 BLOOD TESTS PRIOR TO TREATMENT OR PROCEDURE: Primary | ICD-10-CM

## 2021-07-26 ENCOUNTER — CLINICAL SUPPORT NO REQUIREMENTS (OUTPATIENT)
Dept: PULMONOLOGY | Facility: CLINIC | Age: 76
End: 2021-07-26

## 2021-07-26 DIAGNOSIS — Z01.812 BLOOD TESTS PRIOR TO TREATMENT OR PROCEDURE: ICD-10-CM

## 2021-07-26 PROCEDURE — 99211 OFF/OP EST MAY X REQ PHY/QHP: CPT | Performed by: NURSE PRACTITIONER

## 2021-07-26 PROCEDURE — U0004 COV-19 TEST NON-CDC HGH THRU: HCPCS | Performed by: NURSE PRACTITIONER

## 2021-07-27 LAB — SARS-COV-2 RNA NOSE QL NAA+PROBE: NOT DETECTED

## 2021-07-28 ENCOUNTER — OFFICE VISIT (OUTPATIENT)
Dept: PULMONOLOGY | Facility: CLINIC | Age: 76
End: 2021-07-28

## 2021-07-28 VITALS
SYSTOLIC BLOOD PRESSURE: 142 MMHG | BODY MASS INDEX: 23.22 KG/M2 | WEIGHT: 123 LBS | HEIGHT: 61 IN | TEMPERATURE: 97.5 F | DIASTOLIC BLOOD PRESSURE: 90 MMHG | OXYGEN SATURATION: 93 % | HEART RATE: 53 BPM

## 2021-07-28 DIAGNOSIS — J45.20 MILD INTERMITTENT ASTHMA WITHOUT COMPLICATION: Primary | ICD-10-CM

## 2021-07-28 DIAGNOSIS — R04.2 HEMOPTYSIS: ICD-10-CM

## 2021-07-28 DIAGNOSIS — D86.9 SARCOIDOSIS: ICD-10-CM

## 2021-07-28 PROCEDURE — 94010 BREATHING CAPACITY TEST: CPT | Performed by: NURSE PRACTITIONER

## 2021-07-28 PROCEDURE — 99213 OFFICE O/P EST LOW 20 MIN: CPT | Performed by: NURSE PRACTITIONER

## 2021-07-28 RX ORDER — CHOLECALCIFEROL (VITAMIN D3) 50 MCG
TABLET ORAL
COMMUNITY
Start: 2020-10-15

## 2021-07-28 RX ORDER — IBUPROFEN 200 MG
CAPSULE ORAL
COMMUNITY
Start: 2021-01-07

## 2021-07-28 NOTE — PROGRESS NOTES
"[facility] Pulmonary Follow up      Chief Complaint  Asthma    Subjective          Lucy Thompson presents to [facility] for routine follow-up on her asthma and sarcoidosis.    She is routinely followed by Dr. Gerhardt Stein here at the office.    Her asthma is well controlled on a once daily ICS/LABA.  She has rare use of her albuterol rescue inhaler.  She said no recent acute exacerbation or illness.  She says her shortness of breath is at baseline.  She denies any chest tightness, cough, or sputum production.    She also has a history of sarcoidosis with chronically abnormal CT scan of the chest with multiple bilateral pulmonary nodules and adenopathy.    She does have occasional episodes of hemoptysis when she has acute illnesses or exacerbations.  She said no hemoptysis since her last visit.      Objective     Vital Signs:   /90   Pulse 53   Temp 97.5 °F (36.4 °C)   Ht 154.9 cm (61\")   Wt 55.8 kg (123 lb)   SpO2 93% Comment: resting at room air  BMI 23.24 kg/m²       Immunization History   Administered Date(s) Administered   • COVID-19 (MODERNA) 02/12/2021, 03/12/2021   • Flu Vaccine Quad PF >18YRS 09/13/2011   • Fluzone High Dose =>65 Years (Vaxcare ONLY) 10/01/2013, 11/13/2015, 10/09/2019, 10/06/2020   • Pneumococcal Conjugate 13-Valent (PCV13) 05/04/2016   • Pneumococcal Polysaccharide (PPSV23) 10/01/2013   • Tdap 11/13/2015       Physical Exam  Vitals reviewed.   Constitutional:       Appearance: She is well-developed.   HENT:      Head: Normocephalic and atraumatic.   Eyes:      Pupils: Pupils are equal, round, and reactive to light.   Cardiovascular:      Rate and Rhythm: Normal rate and regular rhythm.      Heart sounds: No murmur heard.     Pulmonary:      Effort: Pulmonary effort is normal. No respiratory distress.      Breath sounds: Normal breath sounds. No wheezing or rales.   Abdominal:      General: Bowel sounds are normal. There is " no distension.      Palpations: Abdomen is soft.   Musculoskeletal:         General: Normal range of motion.      Cervical back: Normal range of motion and neck supple.   Skin:     General: Skin is warm and dry.      Findings: No erythema.   Neurological:      Mental Status: She is alert and oriented to person, place, and time.   Psychiatric:         Behavior: Behavior normal.          Result Review :            PFTs done in the office today:  No changes in her moderate obstructive airway disease with an FEV1 of 1.09, 58% predicted.                   Assessment and Plan    Problem List Items Addressed This Visit        Multi-system (Lupus, Sarcoid...)    Sarcoidosis    Relevant Orders    CT Chest Without Contrast       Pulmonary and Pneumonias    Asthma - Primary    Relevant Orders    Spirometry Without Bronchodilator (Completed)    Hemoptysis          At this time her asthma is well controlled.  She will continue on her daily ICS/LABA.  She is up-to-date on her prescriptions.    She has had no recent episodes of hemoptysis.    She will be due her follow-up CT scan in October.      I spent 25 minutes caring for Lucy on this date of service. This time includes time spent by me in the following activities:preparing for the visit, reviewing tests, obtaining and/or reviewing a separately obtained history, performing a medically appropriate examination and/or evaluation , counseling and educating the patient/family/caregiver, ordering medications, tests, or procedures, referring and communicating with other health care professionals  and documenting information in the medical record  Follow Up     No follow-ups on file.  Patient was given instructions and counseling regarding her condition or for health maintenance advice. Please see specific information pulled into the AVS if appropriate.     RHETT Bagley, ACNP  [facility]  Electronically signed

## 2021-09-22 ENCOUNTER — TRANSCRIBE ORDERS (OUTPATIENT)
Dept: LAB | Facility: HOSPITAL | Age: 76
End: 2021-09-22

## 2021-09-22 DIAGNOSIS — M81.0 SENILE OSTEOPOROSIS: Primary | ICD-10-CM

## 2021-09-23 ENCOUNTER — TRANSCRIBE ORDERS (OUTPATIENT)
Dept: ADMINISTRATIVE | Facility: HOSPITAL | Age: 76
End: 2021-09-23

## 2021-09-23 DIAGNOSIS — R59.0 ABDOMINAL LYMPHADENOPATHY: Primary | ICD-10-CM

## 2021-10-01 ENCOUNTER — APPOINTMENT (OUTPATIENT)
Dept: CT IMAGING | Facility: HOSPITAL | Age: 76
End: 2021-10-01

## 2021-10-05 ENCOUNTER — TRANSCRIBE ORDERS (OUTPATIENT)
Dept: LAB | Facility: HOSPITAL | Age: 76
End: 2021-10-05

## 2021-10-05 ENCOUNTER — LAB (OUTPATIENT)
Dept: LAB | Facility: HOSPITAL | Age: 76
End: 2021-10-05

## 2021-10-05 DIAGNOSIS — M81.0 SENILE OSTEOPOROSIS: Primary | ICD-10-CM

## 2021-10-05 DIAGNOSIS — M81.0 SENILE OSTEOPOROSIS: ICD-10-CM

## 2021-10-05 PROCEDURE — 82306 VITAMIN D 25 HYDROXY: CPT

## 2021-10-05 PROCEDURE — 36415 COLL VENOUS BLD VENIPUNCTURE: CPT

## 2021-10-06 LAB — 25(OH)D3 SERPL-MCNC: 45.5 NG/ML (ref 30–100)

## 2021-11-01 ENCOUNTER — HOSPITAL ENCOUNTER (OUTPATIENT)
Dept: CT IMAGING | Facility: HOSPITAL | Age: 76
Discharge: HOME | End: 2021-11-01
Admitting: NURSE PRACTITIONER

## 2021-11-01 DIAGNOSIS — R59.0 ABDOMINAL LYMPHADENOPATHY: ICD-10-CM

## 2021-11-01 DIAGNOSIS — D86.9 SARCOIDOSIS: ICD-10-CM

## 2021-11-01 LAB — CREAT BLDA-MCNC: 0.9 MG/DL (ref 0.6–1.3)

## 2021-11-01 PROCEDURE — 25010000002 IOPAMIDOL 61 % SOLUTION: Performed by: INTERNAL MEDICINE

## 2021-11-01 PROCEDURE — 82565 ASSAY OF CREATININE: CPT

## 2021-11-01 PROCEDURE — 71250 CT THORAX DX C-: CPT

## 2021-11-01 PROCEDURE — 74177 CT ABD & PELVIS W/CONTRAST: CPT

## 2021-11-01 RX ADMIN — IOPAMIDOL 95 ML: 612 INJECTION, SOLUTION INTRAVENOUS at 10:57

## 2021-12-10 ENCOUNTER — TELEPHONE (OUTPATIENT)
Dept: PULMONOLOGY | Facility: CLINIC | Age: 76
End: 2021-12-10

## 2021-12-10 RX ORDER — PREDNISONE 10 MG/1
TABLET ORAL
Qty: 31 TABLET | Refills: 0 | Status: SHIPPED | OUTPATIENT
Start: 2021-12-10 | End: 2022-01-10

## 2021-12-10 RX ORDER — AZITHROMYCIN 250 MG/1
TABLET, FILM COATED ORAL
Qty: 6 TABLET | Refills: 0 | Status: SHIPPED | OUTPATIENT
Start: 2021-12-10 | End: 2022-01-10

## 2021-12-10 NOTE — TELEPHONE ENCOUNTER
Pt called today c/o fever/chest tightness/cough w/ sputum for 2-3 days and requesting abx/steroids needing to be sent to pharmacy. Pt denies sore throat/nasal drainage/sob. Pt can be reached @ [phone].

## 2021-12-10 NOTE — TELEPHONE ENCOUNTER
A course of antibiotics and a prednisone taper was sent to their pharmacy on file.  May also use Mucinex and Delsym OTC for the cough and congestin.  Follow up in 5-7 days if no improvement with a CXR.

## 2022-01-04 ENCOUNTER — HOSPITAL ENCOUNTER (OUTPATIENT)
Dept: GENERAL RADIOLOGY | Facility: HOSPITAL | Age: 77
Discharge: HOME | End: 2022-01-04
Admitting: FAMILY MEDICINE

## 2022-01-04 ENCOUNTER — TRANSCRIBE ORDERS (OUTPATIENT)
Dept: ADMINISTRATIVE | Facility: HOSPITAL | Age: 77
End: 2022-01-04

## 2022-01-04 DIAGNOSIS — U07.1 CLINICAL DIAGNOSIS OF COVID-19: Primary | ICD-10-CM

## 2022-01-04 DIAGNOSIS — U07.1 CLINICAL DIAGNOSIS OF COVID-19: ICD-10-CM

## 2022-01-04 PROCEDURE — 71046 X-RAY EXAM CHEST 2 VIEWS: CPT

## 2022-01-10 ENCOUNTER — OFFICE VISIT (OUTPATIENT)
Dept: PULMONOLOGY | Facility: CLINIC | Age: 77
End: 2022-01-10

## 2022-01-10 VITALS
HEART RATE: 66 BPM | OXYGEN SATURATION: 98 % | SYSTOLIC BLOOD PRESSURE: 134 MMHG | TEMPERATURE: 97.5 F | DIASTOLIC BLOOD PRESSURE: 74 MMHG | HEIGHT: 61 IN | WEIGHT: 124.13 LBS | RESPIRATION RATE: 16 BRPM | BODY MASS INDEX: 23.43 KG/M2

## 2022-01-10 DIAGNOSIS — Z86.16 HISTORY OF COVID-19: ICD-10-CM

## 2022-01-10 DIAGNOSIS — J93.9 PNEUMOTHORAX ON LEFT: Primary | ICD-10-CM

## 2022-01-10 DIAGNOSIS — R06.02 SHORTNESS OF BREATH: ICD-10-CM

## 2022-01-10 PROCEDURE — 99214 OFFICE O/P EST MOD 30 MIN: CPT | Performed by: NURSE PRACTITIONER

## 2022-01-10 RX ORDER — TIMOLOL MALEATE 5 MG/ML
SOLUTION/ DROPS OPHTHALMIC
COMMUNITY
Start: 2021-12-02

## 2022-01-10 RX ORDER — GUAIFENESIN 600 MG/1
600 TABLET, EXTENDED RELEASE ORAL 2 TIMES DAILY
COMMUNITY
Start: 2021-12-17

## 2022-01-10 RX ORDER — TIMOLOL 5.12 MG/ML
SOLUTION/ DROPS OPHTHALMIC EVERY 12 HOURS
COMMUNITY
End: 2022-01-10 | Stop reason: SDUPTHER

## 2022-01-10 NOTE — PROGRESS NOTES
"[facility] Pulmonary Follow up      Chief Complaint  Mild Intermittent Astham w/out Complication (f/u) and Shortness of Breath    Subjective          Lucy Thompson presents to [facility] for follow-up after her hospitalization at [facility].  She was admitted for COVID-19 and having a left-sided pneumothorax.  She did have a chest tube in place.  Last week she did follow-up with her primary care doctor Imelda, her x-ray on January 4 showed a persistent left apical pneumothorax with chest wall subcutaneous emphysema.    She states she has actually done much better over the last several days.  Her shortness of breath has improved.  She continues to monitor her oxygen saturations and they are mostly low at night or first thing in the morning.  Dr. Segura has arranged oxygen, but due to the weather that has not been delivered yet.  There is post to set her up today.    She also feels like the subcutaneous emphysema has significantly improved.  It was all the way up the right side of her neck and on her chest and there is a minimal crackles currently.    She has no cough or sputum production.    She does continue to use her albuterol as needed to help with some shortness of breath.      Objective     Vital Signs:   /74 (BP Location: Left arm, Patient Position: Sitting, Cuff Size: Adult)   Pulse 66   Temp 97.5 °F (36.4 °C)   Resp 16   Ht 154.9 cm (61\")   Wt 56.3 kg (124 lb 2 oz)   SpO2 98% Comment: room air at rest  BMI 23.45 kg/m²       Immunization History   Administered Date(s) Administered   • COVID-19 (MODERNA) 1st, 2nd, 3rd Dose Only 02/12/2021, 03/12/2021, 11/17/2021   • Flu Vaccine Quad PF >18YRS 09/13/2011   • Fluzone High Dose =>65 Years (Vaxcare ONLY) 10/01/2013, 11/13/2015, 10/09/2019, 10/06/2020   • Pneumococcal Conjugate 13-Valent (PCV13) 05/04/2016   • Pneumococcal Polysaccharide (PPSV23) 10/01/2013   • Tdap " 11/13/2015       Physical Exam  Vitals reviewed.   Constitutional:       General: She is not in acute distress.     Appearance: She is well-developed. She is not ill-appearing.   HENT:      Head: Normocephalic and atraumatic.   Eyes:      Pupils: Pupils are equal, round, and reactive to light.   Cardiovascular:      Rate and Rhythm: Normal rate and regular rhythm.      Heart sounds: No murmur heard.      Pulmonary:      Effort: Pulmonary effort is normal. No respiratory distress.      Breath sounds: Normal breath sounds. No wheezing or rales.   Chest:      Chest wall: No crepitus.   Abdominal:      General: Bowel sounds are normal. There is no distension.      Palpations: Abdomen is soft.   Musculoskeletal:         General: Normal range of motion.      Cervical back: Normal range of motion and neck supple.   Skin:     General: Skin is warm and dry.      Findings: No erythema.   Neurological:      Mental Status: She is alert and oriented to person, place, and time.   Psychiatric:         Behavior: Behavior normal.          Result Review :              PA and lateral chest x-ray done the office today reviewed by me  Awaiting final MD interpretation                 Assessment and Plan    Problem List Items Addressed This Visit        Pulmonary and Pneumonias    Asthma    Pneumothorax on left - Primary    Relevant Medications    Mucus Relief 600 MG 12 hr tablet       Other    History of COVID-19    Overview     Admitted [facility] in December 2021               On my review it does seem like she has a continued left-sided apical pneumothorax, but the subcu air from the 4 seems to be improving.  She does state the subcutaneous emphysema as well as her shortness of breath has improved over the last several days as well.  I did encourage her to start wearing her oxygen at night, this should be set up today.  She will continue to monitor oxygen saturation during the day and if they fall below 92% she is to wear her  oxygen as well.    I will let Dr. Segura follow-up on her chest x-ray done here at the office, and I would least like for her to follow-up in 1 week with a repeat chest x-ray.      Follow Up     No follow-ups on file.  Patient was given instructions and counseling regarding her condition or for health maintenance advice. Please see specific information pulled into the AVS if appropriate.     I spent 35 minutes caring for Lucy on this date of service. This time includes time spent by me in the following activities:preparing for the visit, reviewing tests, obtaining and/or reviewing a separately obtained history, performing a medically appropriate examination and/or evaluation , counseling and educating the patient/family/caregiver, ordering medications, tests, or procedures, referring and communicating with other health care professionals , documenting information in the medical record and independently interpreting results and communicating that information with the patient/family/caregiver      RHETT Bagley, ACNP  [facility]  Electronically signed

## 2022-01-12 DIAGNOSIS — Z00.6 EXAMINATION FOR NORMAL COMPARISON OR CONTROL IN CLINICAL RESEARCH: Primary | ICD-10-CM

## 2022-01-17 ENCOUNTER — OFFICE VISIT (OUTPATIENT)
Dept: PULMONOLOGY | Facility: CLINIC | Age: 77
End: 2022-01-17

## 2022-01-17 VITALS
RESPIRATION RATE: 16 BRPM | HEIGHT: 61 IN | WEIGHT: 123 LBS | OXYGEN SATURATION: 96 % | SYSTOLIC BLOOD PRESSURE: 130 MMHG | TEMPERATURE: 97.8 F | BODY MASS INDEX: 23.22 KG/M2 | DIASTOLIC BLOOD PRESSURE: 70 MMHG | HEART RATE: 72 BPM

## 2022-01-17 DIAGNOSIS — Z86.16 HISTORY OF COVID-19: Primary | ICD-10-CM

## 2022-01-17 PROCEDURE — 99213 OFFICE O/P EST LOW 20 MIN: CPT | Performed by: NURSE PRACTITIONER

## 2022-01-17 NOTE — PROGRESS NOTES
"[facility] Pulmonary Follow up      Chief Complaint  Mild Intermittent Asthma w/out Complication (f/u)    Subjective          Lucy Thompson presents to [facility] for follow-up on her recent pneumothorax.  She was recently admitted to [place], mid December, with COVID-19 and had a right pneumothorax and chest tube.  We are following her up here in the office after a persistent pneumothorax on her follow-up chest x-ray.    When I saw her last week we did do a chest x-ray that showed persistent subcutaneous emphysema but no pneumothorax.  She does have a fairly significant right apical bleb.  She is here today for follow-up chest x-ray to assure resolution of the subcu emphysema and no further pneumothorax.    She has been doing fairly well.  She still having a bit of shortness of breath with activity following her COVID-19.  She has no cough or sputum production.  No fevers or chills.  She is back on her normal inhaled regimen including Wixela twice daily, albuterol nebulizers as needed she has been using those 1-2 times a day.      Objective     Vital Signs:   /70 (BP Location: Left arm, Patient Position: Sitting, Cuff Size: Adult)   Pulse 72   Temp 97.8 °F (36.6 °C)   Resp 16   Ht 154.9 cm (61\")   Wt 55.8 kg (123 lb)   SpO2 96% Comment: room air at rest  BMI 23.24 kg/m²       Immunization History   Administered Date(s) Administered   • COVID-19 (MODERNA) 1st, 2nd, 3rd Dose Only 02/12/2021, 03/12/2021, 11/17/2021   • Flu Vaccine Quad PF >18YRS 09/13/2011   • Fluzone High Dose =>65 Years (Vaxcare ONLY) 10/01/2013, 11/13/2015, 10/09/2019, 10/06/2020   • Pneumococcal Conjugate 13-Valent (PCV13) 05/04/2016   • Pneumococcal Polysaccharide (PPSV23) 10/01/2013   • Tdap 11/13/2015       Physical Exam  Vitals reviewed.   Constitutional:       Appearance: She is well-developed.   HENT:      Head: Normocephalic and atraumatic.   Eyes:      Pupils: Pupils are " equal, round, and reactive to light.   Cardiovascular:      Rate and Rhythm: Normal rate and regular rhythm.      Heart sounds: No murmur heard.      Pulmonary:      Effort: Pulmonary effort is normal. No respiratory distress.      Breath sounds: Normal breath sounds. No wheezing or rales.   Abdominal:      General: Bowel sounds are normal. There is no distension.      Palpations: Abdomen is soft.   Musculoskeletal:         General: Normal range of motion.      Cervical back: Normal range of motion and neck supple.   Skin:     General: Skin is warm and dry.      Findings: No erythema.   Neurological:      Mental Status: She is alert and oriented to person, place, and time.   Psychiatric:         Behavior: Behavior normal.          Result Review :              PA and lateral chest x-ray done the office today reviewed by me  Awaiting final MD interpretation                 Assessment and Plan    Problem List Items Addressed This Visit        Other    History of COVID-19 - Primary    Overview     Admitted [facility] in December 2021           Relevant Orders    XR Chest PA & Lateral        We did review her chest x-ray today in the office.  Her subcu emphysema continues to resolve.  With no evidence of pneumothorax.  I encouraged her to continue to wear her oxygen with activity and at night.  Continue on her chronic maintenance inhalers.  We did discuss it could take a few more weeks before she feels all the way back to baseline following a hospitalization with pneumonia.    Routine follow-up with Dr. Segura in 3 months with PFTs      Follow Up     No follow-ups on file.  Patient was given instructions and counseling regarding her condition or for health maintenance advice. Please see specific information pulled into the AVS if appropriate.     I spent 25 minutes caring for Lucy on this date of service. This time includes time spent by me in the following activities:preparing for the visit, reviewing  tests, obtaining and/or reviewing a separately obtained history, performing a medically appropriate examination and/or evaluation , counseling and educating the patient/family/caregiver, ordering medications, tests, or procedures, referring and communicating with other health care professionals , documenting information in the medical record and independently interpreting results and communicating that information with the patient/family/caregiver      RHETT Bagley, ACNP  [facility]  Electronically signed

## 2022-02-22 ENCOUNTER — LAB (OUTPATIENT)
Dept: LAB | Facility: HOSPITAL | Age: 77
End: 2022-02-22

## 2022-04-12 ENCOUNTER — TELEPHONE (OUTPATIENT)
Dept: PULMONOLOGY | Facility: CLINIC | Age: 77
End: 2022-04-12

## 2022-04-12 RX ORDER — AZITHROMYCIN 250 MG/1
TABLET, FILM COATED ORAL
Qty: 6 TABLET | Refills: 0 | Status: SHIPPED | OUTPATIENT
Start: 2022-04-12

## 2022-04-12 NOTE — TELEPHONE ENCOUNTER
Patient called stating she had been around her grandchildren last weekend who were ill at the time (but tested negative for COVID) and now she's had a dry cough with general malaise for about three days now. She's going on vacation next week and would like to be feeling better before then, however she's unable to make an appt here in the office to see you this week. Wants to know if you'd call something in for her.

## 2022-05-09 RX ORDER — FLUTICASONE PROPIONATE AND SALMETEROL 500; 50 UG/1; UG/1
1 POWDER RESPIRATORY (INHALATION)
Qty: 180 EACH | Refills: 1 | Status: SHIPPED | OUTPATIENT
Start: 2022-05-09

## 2022-05-09 NOTE — TELEPHONE ENCOUNTER
Fax refill request for Rx Wixela approved and faxed per chart to [facility].

## 2022-05-12 DIAGNOSIS — Z01.812 BLOOD TESTS PRIOR TO TREATMENT OR PROCEDURE: Primary | ICD-10-CM

## 2022-05-18 ENCOUNTER — TRANSCRIBE ORDERS (OUTPATIENT)
Dept: ADMINISTRATIVE | Facility: HOSPITAL | Age: 77
End: 2022-05-18

## 2022-05-18 DIAGNOSIS — G45.9 TIA (TRANSIENT ISCHEMIC ATTACK): Primary | ICD-10-CM

## 2022-05-23 ENCOUNTER — TRANSCRIBE ORDERS (OUTPATIENT)
Dept: ADMINISTRATIVE | Facility: HOSPITAL | Age: 77
End: 2022-05-23

## 2022-05-23 DIAGNOSIS — G45.8 OTHER TRANSIENT CEREBRAL ISCHEMIC ATTACKS AND RELATED SYNDROMES: Primary | ICD-10-CM

## 2022-05-23 DIAGNOSIS — G45.9 TRANSIENT ISCHEMIC ATTACK: ICD-10-CM

## 2022-06-06 ENCOUNTER — HOSPITAL ENCOUNTER (OUTPATIENT)
Dept: MRI IMAGING | Facility: HOSPITAL | Age: 77
Discharge: HOME | End: 2022-06-06
Admitting: INTERNAL MEDICINE

## 2022-06-06 DIAGNOSIS — G45.9 TRANSIENT ISCHEMIC ATTACK: ICD-10-CM

## 2022-06-06 PROCEDURE — 70551 MRI BRAIN STEM W/O DYE: CPT

## 2022-06-13 ENCOUNTER — HOSPITAL ENCOUNTER (OUTPATIENT)
Dept: CARDIOLOGY | Facility: HOSPITAL | Age: 77
Discharge: HOME | End: 2022-06-13

## 2022-06-13 VITALS — BODY MASS INDEX: 23.98 KG/M2 | WEIGHT: 127 LBS | HEIGHT: 61 IN

## 2022-06-13 DIAGNOSIS — G45.9 TRANSIENT ISCHEMIC ATTACK: ICD-10-CM

## 2022-06-13 DIAGNOSIS — G45.8 OTHER TRANSIENT CEREBRAL ISCHEMIC ATTACKS AND RELATED SYNDROMES: ICD-10-CM

## 2022-06-13 LAB
BH CV ECHO MEAS - AO MAX PG: 3.8 MMHG
BH CV ECHO MEAS - AO MEAN PG: 2 MMHG
BH CV ECHO MEAS - AO ROOT DIAM: 2.6 CM
BH CV ECHO MEAS - AO V2 MAX: 98.1 CM/SEC
BH CV ECHO MEAS - AO V2 VTI: 23 CM
BH CV ECHO MEAS - AVA(I,D): 2.19 CM2
BH CV ECHO MEAS - EDV(CUBED): 74.1 ML
BH CV ECHO MEAS - EDV(MOD-SP2): 46.2 ML
BH CV ECHO MEAS - EDV(MOD-SP4): 67.5 ML
BH CV ECHO MEAS - EF(MOD-BP): 50 %
BH CV ECHO MEAS - EF(MOD-SP2): 55.6 %
BH CV ECHO MEAS - EF(MOD-SP4): 47.4 %
BH CV ECHO MEAS - ESV(CUBED): 24.4 ML
BH CV ECHO MEAS - ESV(MOD-SP2): 20.5 ML
BH CV ECHO MEAS - ESV(MOD-SP4): 35.5 ML
BH CV ECHO MEAS - FS: 31 %
BH CV ECHO MEAS - IVS/LVPW: 0.89 CM
BH CV ECHO MEAS - IVSD: 0.8 CM
BH CV ECHO MEAS - LA DIMENSION: 3.6 CM
BH CV ECHO MEAS - LAT PEAK E' VEL: 7.5 CM/SEC
BH CV ECHO MEAS - LV MASS(C)D: 109.8 GRAMS
BH CV ECHO MEAS - LV MAX PG: 2 MMHG
BH CV ECHO MEAS - LV MEAN PG: 1 MMHG
BH CV ECHO MEAS - LV V1 MAX: 70.7 CM/SEC
BH CV ECHO MEAS - LV V1 VTI: 16 CM
BH CV ECHO MEAS - LVIDD: 4.2 CM
BH CV ECHO MEAS - LVIDS: 2.9 CM
BH CV ECHO MEAS - LVOT AREA: 3.1 CM2
BH CV ECHO MEAS - LVOT DIAM: 2 CM
BH CV ECHO MEAS - LVPWD: 0.9 CM
BH CV ECHO MEAS - MED PEAK E' VEL: 5.3 CM/SEC
BH CV ECHO MEAS - MV A MAX VEL: 76.5 CM/SEC
BH CV ECHO MEAS - MV DEC TIME: 0.18 MSEC
BH CV ECHO MEAS - MV E MAX VEL: 47.2 CM/SEC
BH CV ECHO MEAS - MV E/A: 0.62
BH CV ECHO MEAS - PA ACC TIME: 0.08 SEC
BH CV ECHO MEAS - PA PR(ACCEL): 42.6 MMHG
BH CV ECHO MEAS - PI END-D VEL: 109 CM/SEC
BH CV ECHO MEAS - SV(LVOT): 50.3 ML
BH CV ECHO MEAS - SV(MOD-SP2): 25.7 ML
BH CV ECHO MEAS - SV(MOD-SP4): 32 ML
BH CV ECHO MEAS - TAPSE (>1.6): 1.88 CM
BH CV ECHO MEASUREMENTS AVERAGE E/E' RATIO: 7.38
BH CV VAS BP LEFT ARM: NORMAL MMHG
BH CV XLRA - RV BASE: 4.1 CM
BH CV XLRA - RV LENGTH: 6.3 CM
BH CV XLRA - RV MID: 3.3 CM
BH CV XLRA - TDI S': 10.3 CM/SEC
BH CV XLRA MEAS LEFT DIST CCA EDV: 24.9 CM/SEC
BH CV XLRA MEAS LEFT DIST CCA PSV: 74.6 CM/SEC
BH CV XLRA MEAS LEFT DIST ICA EDV: -26.7 CM/SEC
BH CV XLRA MEAS LEFT DIST ICA PSV: -89.5 CM/SEC
BH CV XLRA MEAS LEFT ICA/CCA RATIO: 1.03
BH CV XLRA MEAS LEFT MID CCA EDV: 25.5 CM/SEC
BH CV XLRA MEAS LEFT MID CCA PSV: 88.8 CM/SEC
BH CV XLRA MEAS LEFT MID ICA EDV: -24.2 CM/SEC
BH CV XLRA MEAS LEFT MID ICA PSV: -91.3 CM/SEC
BH CV XLRA MEAS LEFT PROX CCA EDV: 16.8 CM/SEC
BH CV XLRA MEAS LEFT PROX CCA PSV: 75.2 CM/SEC
BH CV XLRA MEAS LEFT PROX ECA EDV: -12.4 CM/SEC
BH CV XLRA MEAS LEFT PROX ECA PSV: -71.4 CM/SEC
BH CV XLRA MEAS LEFT PROX ICA EDV: 21.7 CM/SEC
BH CV XLRA MEAS LEFT PROX ICA PSV: 74.6 CM/SEC
BH CV XLRA MEAS LEFT PROX SCLA PSV: 106 CM/SEC
BH CV XLRA MEAS LEFT VERTEBRAL A EDV: 19.9 CM/SEC
BH CV XLRA MEAS LEFT VERTEBRAL A PSV: 61.5 CM/SEC
BH CV XLRA MEAS RIGHT DIST CCA EDV: -19.3 CM/SEC
BH CV XLRA MEAS RIGHT DIST CCA PSV: -66.5 CM/SEC
BH CV XLRA MEAS RIGHT DIST ICA EDV: -18.9 CM/SEC
BH CV XLRA MEAS RIGHT DIST ICA PSV: -74.6 CM/SEC
BH CV XLRA MEAS RIGHT ICA/CCA RATIO: 0.94
BH CV XLRA MEAS RIGHT MID CCA EDV: 15.5 CM/SEC
BH CV XLRA MEAS RIGHT MID CCA PSV: 89.5 CM/SEC
BH CV XLRA MEAS RIGHT MID ICA EDV: -29.6 CM/SEC
BH CV XLRA MEAS RIGHT MID ICA PSV: -84 CM/SEC
BH CV XLRA MEAS RIGHT PROX CCA EDV: 16.8 CM/SEC
BH CV XLRA MEAS RIGHT PROX CCA PSV: 62.1 CM/SEC
BH CV XLRA MEAS RIGHT PROX ECA EDV: -11.9 CM/SEC
BH CV XLRA MEAS RIGHT PROX ECA PSV: -73.8 CM/SEC
BH CV XLRA MEAS RIGHT PROX ICA EDV: -19.8 CM/SEC
BH CV XLRA MEAS RIGHT PROX ICA PSV: -57.6 CM/SEC
BH CV XLRA MEAS RIGHT PROX SCLA PSV: 64 CM/SEC
BH CV XLRA MEAS RIGHT VERTEBRAL A EDV: 12.7 CM/SEC
BH CV XLRA MEAS RIGHT VERTEBRAL A PSV: 38.2 CM/SEC
LEFT ARM BP: NORMAL MMHG
LEFT ATRIUM VOLUME INDEX: 20.6 ML/M2
MAXIMAL PREDICTED HEART RATE: 145 BPM
MAXIMAL PREDICTED HEART RATE: 145 BPM
RIGHT ARM BP: NORMAL MMHG
STRESS TARGET HR: 123 BPM
STRESS TARGET HR: 123 BPM

## 2022-06-13 PROCEDURE — 93880 EXTRACRANIAL BILAT STUDY: CPT | Performed by: INTERNAL MEDICINE

## 2022-06-13 PROCEDURE — 93306 TTE W/DOPPLER COMPLETE: CPT | Performed by: INTERNAL MEDICINE

## 2022-06-13 PROCEDURE — 93880 EXTRACRANIAL BILAT STUDY: CPT

## 2022-06-13 PROCEDURE — 93306 TTE W/DOPPLER COMPLETE: CPT

## 2022-06-28 PROBLEM — R06.09 DYSPNEA ON EXERTION: Status: ACTIVE | Noted: 2022-06-28

## 2022-12-30 ENCOUNTER — TRANSCRIBE ORDERS (OUTPATIENT)
Dept: ADMINISTRATIVE | Facility: HOSPITAL | Age: 77
End: 2022-12-30
Payer: MEDICARE

## 2022-12-30 DIAGNOSIS — R60.0 EDEMA OF EXTREMITY OF UNKNOWN CAUSE: Primary | ICD-10-CM

## 2022-12-30 DIAGNOSIS — R60.9 SWELLING: ICD-10-CM

## 2022-12-30 DIAGNOSIS — R60.9 EDEMA, UNSPECIFIED TYPE: ICD-10-CM

## 2023-01-03 ENCOUNTER — APPOINTMENT (OUTPATIENT)
Dept: CARDIOLOGY | Facility: HOSPITAL | Age: 78
End: 2023-01-03
Payer: MEDICARE

## 2023-01-10 ENCOUNTER — HOSPITAL ENCOUNTER (OUTPATIENT)
Dept: CARDIOLOGY | Facility: HOSPITAL | Age: 78
End: 2023-01-10
Payer: MEDICARE

## 2024-03-19 ENCOUNTER — HOSPITAL ENCOUNTER (OUTPATIENT)
Dept: GENERAL RADIOLOGY | Facility: HOSPITAL | Age: 79
Discharge: HOME | End: 2024-03-19
Admitting: FAMILY MEDICINE
Payer: MEDICARE

## 2024-03-19 ENCOUNTER — TRANSCRIBE ORDERS (OUTPATIENT)
Dept: GENERAL RADIOLOGY | Facility: HOSPITAL | Age: 79
End: 2024-03-19
Payer: MEDICARE

## 2024-03-19 DIAGNOSIS — D86.9 SARCOIDOSIS: Primary | ICD-10-CM

## 2024-03-19 DIAGNOSIS — D86.9 SARCOIDOSIS: ICD-10-CM

## 2024-03-19 PROCEDURE — 71046 X-RAY EXAM CHEST 2 VIEWS: CPT

## 2024-09-16 ENCOUNTER — TRANSCRIBE ORDERS (OUTPATIENT)
Dept: ADMINISTRATIVE | Facility: HOSPITAL | Age: 79
End: 2024-09-16
Payer: MEDICARE

## 2024-09-16 ENCOUNTER — HOSPITAL ENCOUNTER (OUTPATIENT)
Dept: GENERAL RADIOLOGY | Facility: HOSPITAL | Age: 79
Discharge: HOME | End: 2024-09-16
Admitting: FAMILY MEDICINE
Payer: MEDICARE

## 2024-09-16 DIAGNOSIS — M25.551 HIP PAIN, ACUTE, RIGHT: Primary | ICD-10-CM

## 2024-09-16 PROCEDURE — 73502 X-RAY EXAM HIP UNI 2-3 VIEWS: CPT
